# Patient Record
Sex: MALE | Race: BLACK OR AFRICAN AMERICAN
[De-identification: names, ages, dates, MRNs, and addresses within clinical notes are randomized per-mention and may not be internally consistent; named-entity substitution may affect disease eponyms.]

---

## 2019-06-06 NOTE — NUR
Per chace, the patient had a bag of substance in his belongings and placed it on bedside 
table. Chace grabbed the small plastic bag and informed this RN of situation. Patient 
states, "I didn't know I had that in my stuff". Charge RN and security aware of situation. 
Patient states

-------------------------------------------------------------------------------

Addendum: 06/06/19 at 2336 by VITOR FALCON RN

-------------------------------------------------------------------------------

Patient states that he does not want it anymore. Security informed the patient that he must 
not have any substance in his possession here at the hospital. Will continue to monitor 
patient.

## 2019-06-06 NOTE — NUR
PT REPORTS HE WANTS TO TAKE A SHOWER. PT ASKED FOR RAZOR, NO RAZOR GIVEN. IV WRAPPED AND 
TAPED. PT  UPON ADMISSION, BUT PT REPORTS NO SYMPTOMS. STAT EKG DONE. EKG READS HR 
114, SINUS TACHYCARDIA, LATERAL INFARCT, ABNORMAL EKG. PAGED HOSPITALIST TO NOTIFY MD, WILL 
CONTINUE TO MONITOR.

## 2019-06-06 NOTE — NUR
PT ADMITTED TO FLOOR VIA WHEEL CHAIR FROM [facility] PT REPORTS 7/10 PAIN IN LEFT ARM. PT REPORTS 
HE'S HAVING SUICIDAL THOUGHTS. PT ORIENTED TO CALL LIGHT AND UNIT, SITTER AT BEDSIDE. 
VITALS: 98.3, 02 95, , /71, RR 18.

## 2019-06-06 NOTE — NUR
Opening Shift Note

Assumed care of patient, awake and alert.  No S/S of distress/SOB or pain. Sitter at bedside 
for safety due to patient with suicidal ideations. Bed locked in lowest position, side rails 
upx2, call light within reach. Instructed on POC and to call for assist PRN, will continue 
to monitor for changes Q1hr and PRN.

## 2019-06-06 NOTE — NUR
CALLED TELE MED AND INITIATED PSYCH CONSULT, ULTRASOUND CALLED AND REPORTS THEY WILL BE HERE 
SOON TO DO LIVER ULTRASOUND.

## 2019-06-06 NOTE — NUR
WOUND PHOTOS TAKEN, LIVER ULTRASOUND DONE, AND TELE MONITOR ALREADY IN PT ROOM AWAITING MD 
TO CALL.

## 2019-06-07 NOTE — NUR
KASSIDY JACOBSEN. SPOKE WITH RUSTAM WELCH @ 1138

-------------------------------------------------------------------------------

Addendum: 06/07/19 at 1138 by Kallie De La Cruz [facility]

-------------------------------------------------------------------------------

Amended: Links added.

## 2019-06-07 NOTE — NUR
PT SITTING IN CHAIR EATING BREAKFAST, NO DISTRESS NOTED. SITTER AT BEDSIDE, WILL CONTINUE TO 
MONITOR.

## 2019-06-07 NOTE — NUR
Spoke with Dr Lorraine MD requests pt be seen by Dr Ervin today for consult, asked unit 
 to call it in, will continue to monitor.

## 2019-06-08 NOTE — NUR
Closing Note

Patient lying in bed, still states anxiety. Patient taken PRN anxiety medication. NOC nurse 
aware to reassess. Bed in lowest locked position, side rails up x2 and call light within 
reach. Sitter present at bedside.

## 2019-06-08 NOTE — NUR
Patient Took Shower, Verbalizes "not doing well emotionally"

Patient took shower and states that the pain in their legs is feeling better after the PRN 
pain medication. Patient asked for razor in jokingly manor. Patient laughed and stated "no I 
know you guys won't allow that". Patient verbalizes "not doing well emotionally" but states 
they do not want to talk about it right now. Discussed patient's tele psych consult and 
plans for possible placement to help. Patient verbalized understanding.

## 2019-06-08 NOTE — NUR
Open Shift Note

Received report on patient, awake and sitting up in bed. Patient states they are having leg 
pain, discussed PRN pain medication with patient. Patient also verbalizes they want to take 
a shower and how they think it will make them feel better. Discussed POC with patient and 
plans for EGD on Monday. Patient verbalized understanding. Bed in lowest locked position, 
side rails up x2 and call light within reach. Sitter present at bedside. Will continue to 
monitor.

## 2019-06-09 NOTE — NUR
Closing Note

Patient awake and sitting up in bed. Patient shows no signs of distress at this time. Bed in 
lowest locked position, side rails up x2 and call light within reach. Sitter present at 
bedside.

## 2019-06-09 NOTE — NUR
Open Shift Note 

Received report on patient, awake and sitting on side of bed. Patient states they feel "much 
better than yesterday" but still anxious. Discussed ways to help cope with anxiety as well 
as PRN medication. Discussed plans for EGD tomorrow as well as psych placement. Patient 
verbalized understanding. Bed in lowest locked position, side rails up x2 and call light 
within reach. Sitter present at bedside. Will continue to monitor.

## 2019-06-10 NOTE — NUR
RECEIVED PATIENT ALERT AND ORIENTED X4, NO ACTIVE SUICIDAL IDEATION AND PLAN AT THIS MOMENT 
AS REPORTED, NOT IN DISTRESS LUNG SOUNDS CLEAR IN BILATERAL UPPER AND LOWER LOBES, RR=18 
SAT=97%, DENIED SOB OR CHEST PAIN, DEEP BREATHING AND COUGHING WAS ENCOURAGED, VERBALIZED 
AND DEMONSTRATED WELL, HEART RATE=86, ABDOMEN SOFT WITH ACTIVE BS, LAST BM=6/7/19 AS 
REPORTED, BILATERAL UPPER AND LOWER EXTREMITIES EQUALLY STRONG STRENGTH, RADIAL AND PEDAL 
PULSES PALPABLE, AMBULATED TO BR WITH OUT ASSISTANCE, TOLERATED WELL, RESTING ON BED, HEAD 
OF BED ON LOW POSITION, RAILS UP X2, CALL LIGHT ON REACH, PENDING EGD TODAY, WILL CONTINUE 
MONITORING.

## 2019-06-10 NOTE — NUR
CAME BACK FROM EGD, ALERT AND ORIENTED, RESTING ON BED, TOLERATED THE PROVIDED REGULAR DIET 
LUNCH TRAY 100%, RESTING ON BED, SITTER AT BED SIDE, WILL CONTINUE MONITORING.

## 2019-06-10 NOTE — NUR
C/O ANXIETY ATIVAN PO PRN WAS GIVEN AS ORDERED, NOT IN DISTRESS, VS T=98.4 RR=18 SAT=97% 
P==98 FT=971/87, DENIED PAIN, WENT ON BED TO PRE OP FOR EGD, TOLERATED WELL, WILL CONTINUE 
FOLLOW UP.

## 2019-06-10 NOTE — NUR
SS WAS PAGED AND VERIN

-------------------------------------------------------------------------------

Addendum: 06/10/19 at 1927 by Jermaine Soto RN

-------------------------------------------------------------------------------

DIANNE TEXT

## 2019-06-10 NOTE — NUR
PENDING D/C ORDER TO TRANSFER TO [address] [place] [address], PENDING SS TRANSFER PROCESS, SS JESS 
WAS CALLED AND LEFT A MASSAGE SOFIA FOLLOW UP, WAITING FOR CALL BACK.

## 2019-06-10 NOTE — NUR
Nutrition Assessment Notes



please see attached link for complete assessment



Est. Needs BW [id number] kcal (23-25 kcal/kgBW), 85-93 gms pro (1.0-1.1 gms/kgBW). Will 
continue to monitor pertinent labs and reassess nutrient need prn




-------------------------------------------------------------------------------

Addendum: 06/10/19 at 1202 by Seema [address] RD

-------------------------------------------------------------------------------

Amended: Links added.

## 2019-06-11 NOTE — NUR
Opening shift note

Patient in bed alert and oriented x 4, Patient calm and comfortable. Pt's respiration even 
and unlabored, denies pain and discomfort at this time. Plan of care discussed, patient 
verbalized understanding. Patient requested for Gelatin and apple juice. All needs attended, 
sitter at bedside, will continue to monitor.

## 2019-06-11 NOTE — NUR
Left message for Whit , regarding need for psych assessment/Voluntary 
assessment. Waiting on return call or fax.

## 2019-06-11 NOTE — NUR
I called [facility] [phone] and spoke with Christopher in Intake, she said for this patient 
with no insurance he would have to pay 900$ a day for voluntary psych admission to their 
facility.

## 2019-06-11 NOTE — NUR
I faxed packet to [facility]-also letting them know that patient is willing to 
be a voluntary admit-no [id number] at this time.

## 2019-06-11 NOTE — NUR
5150 HOLD ASSESSMENT WAS DONE, FORMS ARE FILLED, SS VANESSA WAS PAGED AND CALLED X2, LEFT A 
MESSAGE AND WAITING FOR CALL BACK, DR. JACOBS WAS NOTIFIED AND AWARE, WAITING SS TO ARRANGE 
D/C PROCESS.

## 2019-06-11 NOTE — NUR
I spoke with patient's nurse to discuss the plan of care for this patient-she will check on 
the status of a C[employer] application for this patient.

## 2019-06-11 NOTE — NUR
Not in distress, denied pain, tolerated shower with no assistance, tolerated well, resting 
on bed, sitter at bedside, will continue monitoring.

## 2019-06-11 NOTE — NUR
RECEIVED PATIENT ALERT AND ORIENTED X4, NO ACTIVE SUICIDAL THOUGHTS AND PLAN AT THIS MOMENT 
AS REPORTED, NOT IN DISTRESS LUNG SOUNDS CLEAR IN BILATERAL UPPER AND LOWER LOBES, RR=18 
SAT=96%, DENIED SOB OR CHEST PAIN, DEEP BREATHING AND COUGHING WAS ENCOURAGED, VERBALIZED 
AND DEMONSTRATED WELL, HEART RATE=94, ABDOMEN SOFT WITH ACTIVE BS, LAST BM=6/10/19 AS 
REPORTED, TOLERATED BREAKFAST WELL, BILATERAL UPPER AND LOWER EXTREMITIES EQUALLY STRONG 
STRENGTH, RADIAL AND PEDAL PULSES PALPABLE, SITTING ON CHAIR,CALL LIGHT ON REACH, SITTER AT 
BED SIDE, PENDING D/C AND SS, WILL CONTINUE MONITORING.

## 2019-06-11 NOTE — NUR
NACHO MENDEZ WAS CALLED ON EXT.1146 FOR D/C ORDER FOLLOW UP AND LEFT A MESSAGE, WAITING FOR CALL 
BACK, WILL CONTINUE MONITORING.

## 2019-06-12 NOTE — NUR
BACTROBAN CREAM APPLIED TO NARES AS ORDERED. PATIENT TOLERATED WELL. PATIENT DENIED PAIN AND 
ANXIETY FOR NOW. CONTINUE TO MONITOR.

## 2019-06-12 NOTE — NUR
asked Nusrat to fax me [phone] application so I can send to [facility] call center

## 2019-06-12 NOTE — NUR
NACHO DHILLON WAS CALLED FOR D/C PROCESS FOLLOW UP AND LEFT A MESSAGE, DR. JACOBS WAS UPDATED 
THE STATUS AND AWARE,, NOT IN DISTRESS, DENIED PAIN, FAMILY VISITORS AND SITTER AT BED SIDE, 
WILL CONTINUE MONITORING

## 2019-06-12 NOTE — NUR
no time on 5150 application when it was done and Koki at [facility] 
stating that no facility will take pt without a time on the 5150 application.

## 2019-06-12 NOTE — NUR
RECEIVED PATIENT FROM DAY SHIFT RN. PATIENT RESTING IN BED. NO S/S OF DISTRESS NOTED. DENIED 
PAIN FOR NOW. POC INSTRUCTED AND ENCOURAGED PATIENT TO CALL FOR ASSISTANT IF NEEDED. BED IN 
LOWEST POSITION WITH SIDE RAILS UP X 2. CALL BELL WITHIN REACH. SITTER AT BEDSIDE FOR 
SAFETY. CONTINUE TO MONITOR FOR CHANGES Q1H AND PRN.

## 2019-06-12 NOTE — NUR
RECEIVED PATIENT ALERT AND ORIENTED X4, NO ACTIVE SUICIDAL THOUGHTS AND PLAN AT THIS MOMENT 
AS REPORTED, NOT IN DISTRESS LUNG SOUNDS CLEAR IN BILATERAL UPPER AND LOWER LOBES, RR=18 
SAT=95%, DENIED SOB OR CHEST PAIN, DEEP BREATHING AND COUGHING WAS ENCOURAGED, VERBALIZED 
AND DEMONSTRATED WELL, HEART CKRP=155, ABDOMEN SOFT WITH ACTIVE BS, LAST BM=6/11/19 AS 
REPORTED, TOLERATED BREAKFAST [id number]%, SKIN DRY AND INTACT, RADIAL AND PEDAL PULSES 
PALPABLE, SITTING ON BED, CALL LIGHT ON REACH, SITTER AT BED SIDE, PENDING D/C AND SS 
PROCESS, WILL CONTINUE MONITORING.

## 2019-06-12 NOTE — NUR
called [facility] call center and was informed that is very hard to place a self 
pay pt and that if pt had medical they may have a chance in getting a pt in a facility.  
Red informed of medical need.

## 2019-06-12 NOTE — NUR
OUT OF THE UNIT WITH THE SITTER FOR SMOKING X3, CAME BACK, RESTING ON BED, PENDING D/C, 
PENDING SS PROCESS WITH  [facility] FOR ACCEPTANCE AS REPORTED.

## 2019-06-12 NOTE — NUR
5150 FORM AND TELE PSYCH RECOMMENDATION INFORMATION WAS FAXED TO [facility] FOR D/C FOLLOW 
UP, JOAN WAS CALLED FOR FAX RECEIVING VERIFICATION.

## 2019-06-12 NOTE — NUR
NOT IN DISTRESS, DENIED PAIN, RESTING ON BED, SITTER AT BED SIDE, REPORT WAS GIVEN TO THE 
NIGHT SHIFT RN.

## 2019-06-12 NOTE — NUR
Nutrition Follow-up Notes



Wt.: 86.3 kg



Pt ws sleeping with no family by bedside. per records pt with no distress noted awaiting tx 
to other facility. pt is currently on regular diet with adequate PO of 100% x 2 days per RN 
doc



Est. Needs BW [id number] kcal (23-25 kcal/kgBW), 85-93 gms pro (1.0-1.1 gms/kgBW). Will 
continue to monitor pertinent labs and reassess nutrient need prn



Labs: ALB 3.2 L. rest lab wnl



Skin: Saúl scale 23, low risk pt has scabs per RN doc , noted pt on MVI

 

GI: Pt had 1 BM yesterday per RN documentation. 



PES:  

Altered nutrition related lab values r/t current/chronic medical condition aeb mild hypoalb

Resolved: Inadequate PO intake r/t current medical condition aeb pt`s NPO for GI consult



Will continue to monitor PO intake, skin status, pertinent labs and weight trend. F/u in 3-5 
days.

Rec.: 1.)  Continue current plan of care.

## 2019-06-13 NOTE — NUR
Contacted the following facilities for assistance with placement:

[facility]- no beds

[facility]- packet faxed for review.

[facility]- cannot accept uninsured, request per day fee

[facility]- cannot accept uninsured

[facility]- cannot accept uninsured

[facility]- No answer at intake, message left. 

[facility]- case by case for uninsured pt's, faxed for review but no adult beds available 
today.

## 2019-06-13 NOTE — NUR
RECEIVED PATIENT FROM DAY SHIFT RN. PATIENT RESTING IN BED. NO S/S OF DISTRESS NOTED. DENIED 
PAIN FOR NOW. NO IV ACCESS NOW. ENCOURAGED PATIENT TO HAVE A NEW IV ACCESS. PATIENT WOULD 
LIKE TO WAIT FOR TOMORROW. HE STATED THAT HE'S BEEN TIRED OF POKING. CHARGE NURSE JAILENE STYLES. POC INSTRUCTED AND ENCOURAGED PATIENT TO CALL FOR ASSISTANT IF NEEDED. BED IN LOWEST 
POSITION WITH SIDE RAILS UP X 2. CALL BELL WITHIN REACH. SITTER AT BEDSIDE FOR SAFETY. 
CONTINUE TO MONITOR FOR CHANGES Q1H AND PRN.

## 2019-06-13 NOTE — NUR
PATIENT RESTING IN BED. NO S/S OF DISTRESS NOTED. SITTER AT BEDSIDE FOR SAFETY. CONTINUE TO 
MONITOR.

## 2019-06-13 NOTE — NUR
HOSPITALIST PAULINA PEREYRA CALLED FOR OTHER PATIENT, NOTIFIED RODDY THAT PATIENT DID NOT WANT TO 
BE INSERTED IV ACCESS TONIGHT. WOULD LIKE TO TRY TOMORROW. RODDY AWARE AND NO NEW ORDER 
RECEIVED. CONTINUE TO MONITOR.

## 2019-06-13 NOTE — NUR
Opening Shift Note

RECEIVED REPORT FROM NOC RN. Assumed care of patient, awake and alert. No S/S of 
distress/SOB or pain. BED IN LOWEST, LOCKED POSITION WITH SIDERAILS UP x2. Instructed on POC 
and to call for assist PRN, will continue to monitor for changes Q1hr and PRN.

## 2019-06-13 NOTE — NUR
[facility]  [facility] Call Center is faxing to facilities but facilities want a payor 
source.

## 2019-06-13 NOTE — NUR
PATIENT DENIED PAIN AND ANXIETY FOR NOW. PATIENT STILL REFUSED TO HAVE IV ACCESS, AND SAID 
THAT HE WILL TRY IT TOMORROW. CHARGE NURSE AWARE. CONTINUE TO MONITOR.

## 2019-06-14 NOTE — NUR
opening 



Patient asleep in bed, bed in lowest position, call light within reach. No distress noted at 
this time. Sitter at bedside. 



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 

Patient had egd with md machado 6/11/19 polyps and biopsy has been send

liver us, abd pelvis, cxr negative

blood cultures negative

UA 1+ blood and ketones few bacteria

Tox positive ETOH and amphetamines



patient is on 5150 hold, per noc nurse needs to be reassessed at 1500 (possibly by ER nurse) 
will bring up to charge. 

SS trying to find placement for patient [facility] however payer sources is 
needed, will f/u with ss or hospitalist about this



tele psych consult has been completed. 

diya the sister is  and phone number in chart 



will closely monitor and f/u with morning assessment

## 2019-06-14 NOTE — NUR
iv



per noc nurse, patient does not have IV 

she states dayshift tried yesterday unsuccessful, MD's are aware. The patient per noc nurse 
refuses the nurse to try to attempt IV insertion last night

## 2019-06-14 NOTE — NUR
NURSE NOTE



CALLING ABOUT CONSULT AGAIN, THEY STATED THEY HAVE MORE EMERGENT CALLS, BUT WILL GET TO THE 
PATIENT AS SOON AS POSSIBLE

## 2019-06-14 NOTE — NUR
I called [facility] [phone] and spoke with Gertrude in Intake, there are no beds 
available at this time-the self pay rate for no insurance is 900$ a day with a 4500$ deposit 
required.

## 2019-06-14 NOTE — NUR
nurse note





calling to set up second tele/psych consult per the request of dr carmona, if cleared, 
patient can be discharge only when charge nurse sabiha is here, otherwise; patient will need 
to wait until tomorrow for discharge

## 2019-06-14 NOTE — NUR
NURSE NOTE



PER CHARGE NURSE ANDRAE 

ACCOMPANIED MD MAURER INTO THE ROOM, PATIENT STATES IF HE IS CLEARED IF HE COULD ATTEND A 
PSYCH FACILITY AS OUTPATIENT, MEANING AFTER A DISCHARGE. 



HOWEVER HE STATES HE ALSO NEEDS TO ASK FAMILY IF HE'S ALLOWED TO RETURN HOME FIRST

## 2019-06-14 NOTE — NUR
I called [facility] [phone] and spoke with house supervisor Candace, she said 
they have no beds available and already have several patients waiting for a bed.

## 2019-06-15 NOTE — NUR
PT COMPLETING THE TELE PSYCH CONSULT.



JORGE CHICAS STATED THAT HE FEELS THIS PT CAN BE DISCHARGED AND DOES NOT NEED TO GO 
TO A PSYCH FACILITY, ONLY AN OUTPATIENT SUBSTANCE ABUSE PROGRAM. 



NOTIFIED CHARGE MANINDER MOJICA. WILL ENDORSE TO DAYSHIFT IN AM/

## 2019-06-15 NOTE — NUR
Discharge instructions given as ordered. Encourage to follow up with PMD as instructed. All 
questions and concerns addressed. Patient verbalized understanding.  Patient ambulated to 
vehicle with all personal belongings.  No distress noted at time of departure.

## 2019-06-15 NOTE — NUR
Opening Shift Note

Assumed care of patient, comfortably sleeping.   Breath sounds even and unlabored. No S/S of 
distress/SOB or pain.  Sitter at bedside. Will continue to monitor for changes Q1hr and PRN.

## 2019-10-10 ENCOUNTER — HOSPITAL ENCOUNTER (EMERGENCY)
Dept: HOSPITAL 15 - ER | Age: 39
LOS: 5 days | Discharge: PSYCHIATRIC OR SUBSTANCE USE FACILITY | End: 2019-10-15
Payer: MEDICAID

## 2019-10-10 VITALS — BODY MASS INDEX: 24.5 KG/M2 | HEIGHT: 71 IN | WEIGHT: 175 LBS

## 2019-10-10 DIAGNOSIS — F20.9: Primary | ICD-10-CM

## 2019-10-10 DIAGNOSIS — F12.10: ICD-10-CM

## 2019-10-10 DIAGNOSIS — F15.10: ICD-10-CM

## 2019-10-10 DIAGNOSIS — F17.210: ICD-10-CM

## 2019-10-10 LAB
ALBUMIN SERPL-MCNC: 3.9 G/DL (ref 3.4–5)
ALCOHOL, URINE: < 3 MG/DL (ref 0–5)
ALP SERPL-CCNC: 78 U/L (ref 45–117)
ALT SERPL-CCNC: 58 U/L (ref 16–61)
AMPHETAMINES UR QL SCN: POSITIVE
ANION GAP SERPL CALCULATED.3IONS-SCNC: 7 MMOL/L (ref 5–15)
APAP SERPL-MCNC: < 2 UG/ML (ref 10–30)
BARBITURATES UR QL SCN: NEGATIVE
BENZODIAZ UR QL SCN: POSITIVE
BILIRUB SERPL-MCNC: 1.2 MG/DL (ref 0.2–1)
BUN SERPL-MCNC: 27 MG/DL (ref 7–18)
BUN/CREAT SERPL: 29
BZE UR QL SCN: NEGATIVE
CALCIUM SERPL-MCNC: 8.8 MG/DL (ref 8.5–10.1)
CANNABINOIDS UR QL SCN: NEGATIVE
CHLORIDE SERPL-SCNC: 101 MMOL/L (ref 98–107)
CO2 SERPL-SCNC: 27 MMOL/L (ref 21–32)
GLUCOSE SERPL-MCNC: 101 MG/DL (ref 74–106)
HCT VFR BLD AUTO: 41.9 % (ref 41–53)
HGB BLD-MCNC: 14.4 G/DL (ref 13.5–17.5)
MAGNESIUM SERPL-MCNC: 2.7 MG/DL (ref 1.6–2.6)
MCH RBC QN AUTO: 32.6 PG (ref 28–32)
MCV RBC AUTO: 95.2 FL (ref 80–100)
NRBC BLD QL AUTO: 0.1 %
OPIATES UR QL SCN: NEGATIVE
PCP UR QL SCN: NEGATIVE
POTASSIUM SERPL-SCNC: 3.4 MMOL/L (ref 3.5–5.1)
PROT SERPL-MCNC: 9 G/DL (ref 6.4–8.2)
SALICYLATES SERPL-MCNC: < 1.7 MG/DL (ref 2.8–20)
SODIUM SERPL-SCNC: 135 MMOL/L (ref 136–145)

## 2019-10-10 PROCEDURE — 85025 COMPLETE CBC W/AUTO DIFF WBC: CPT

## 2019-10-10 PROCEDURE — 81001 URINALYSIS AUTO W/SCOPE: CPT

## 2019-10-10 PROCEDURE — 93005 ELECTROCARDIOGRAM TRACING: CPT

## 2019-10-10 PROCEDURE — 80320 DRUG SCREEN QUANTALCOHOLS: CPT

## 2019-10-10 PROCEDURE — 81002 URINALYSIS NONAUTO W/O SCOPE: CPT

## 2019-10-10 PROCEDURE — 80329 ANALGESICS NON-OPIOID 1 OR 2: CPT

## 2019-10-10 PROCEDURE — 36415 COLL VENOUS BLD VENIPUNCTURE: CPT

## 2019-10-10 PROCEDURE — 80053 COMPREHEN METABOLIC PANEL: CPT

## 2019-10-10 PROCEDURE — 71250 CT THORAX DX C-: CPT

## 2019-10-10 PROCEDURE — 59025 FETAL NON-STRESS TEST: CPT

## 2019-10-10 PROCEDURE — 83735 ASSAY OF MAGNESIUM: CPT

## 2019-10-10 PROCEDURE — 96372 THER/PROPH/DIAG INJ SC/IM: CPT

## 2019-10-10 PROCEDURE — 80307 DRUG TEST PRSMV CHEM ANLYZR: CPT

## 2019-10-11 RX ADMIN — SERTRALINE HYDROCHLORIDE SCH MG: 50 TABLET, FILM COATED ORAL at 12:32

## 2019-10-12 RX ADMIN — SERTRALINE HYDROCHLORIDE SCH MG: 50 TABLET, FILM COATED ORAL at 10:40

## 2019-10-13 RX ADMIN — SERTRALINE HYDROCHLORIDE SCH MG: 50 TABLET, FILM COATED ORAL at 11:23

## 2019-10-13 NOTE — NUR
[facility] s/w Alisa no  beds

[facility] s/w Radha no beds

[facility] s[facility]w Elsa no beds

[facility] s/w Madison no beds

[facility] s/w Dominique

## 2019-10-14 RX ADMIN — SERTRALINE HYDROCHLORIDE SCH MG: 50 TABLET, FILM COATED ORAL at 10:24

## 2019-10-14 NOTE — NUR
OBTAINED PATIENT REPORT FROM EMT

PATIENT LAYING IN BED ASLEEP

NO S/S OF DISTRESS 

WILL CONTINUE TO MONITOR

## 2019-10-15 VITALS — DIASTOLIC BLOOD PRESSURE: 81 MMHG | SYSTOLIC BLOOD PRESSURE: 132 MMHG

## 2019-10-15 RX ADMIN — SERTRALINE HYDROCHLORIDE SCH MG: 50 TABLET, FILM COATED ORAL at 10:08
